# Patient Record
Sex: MALE | Race: WHITE | NOT HISPANIC OR LATINO | Employment: OTHER | URBAN - METROPOLITAN AREA
[De-identification: names, ages, dates, MRNs, and addresses within clinical notes are randomized per-mention and may not be internally consistent; named-entity substitution may affect disease eponyms.]

---

## 2017-07-16 ENCOUNTER — APPOINTMENT (OUTPATIENT)
Dept: GENERAL RADIOLOGY | Facility: HOSPITAL | Age: 82
End: 2017-07-16

## 2017-07-16 ENCOUNTER — APPOINTMENT (OUTPATIENT)
Dept: CT IMAGING | Facility: HOSPITAL | Age: 82
End: 2017-07-16

## 2017-07-16 ENCOUNTER — HOSPITAL ENCOUNTER (INPATIENT)
Facility: HOSPITAL | Age: 82
LOS: 1 days | Discharge: HOSPICE | End: 2017-07-17
Attending: EMERGENCY MEDICINE | Admitting: INTERNAL MEDICINE

## 2017-07-16 DIAGNOSIS — J18.9 PNEUMONIA OF RIGHT LOWER LOBE DUE TO INFECTIOUS ORGANISM: Primary | ICD-10-CM

## 2017-07-16 DIAGNOSIS — R13.10 DYSPHAGIA, UNSPECIFIED: ICD-10-CM

## 2017-07-16 DIAGNOSIS — R53.83 LETHARGY: ICD-10-CM

## 2017-07-16 DIAGNOSIS — R41.841 COGNITIVE COMMUNICATION DEFICIT: ICD-10-CM

## 2017-07-16 DIAGNOSIS — I63.9 CEREBROVASCULAR ACCIDENT (CVA), UNSPECIFIED MECHANISM (HCC): ICD-10-CM

## 2017-07-16 PROBLEM — J69.0 ASPIRATION PNEUMONIA (HCC): Status: ACTIVE | Noted: 2017-07-16

## 2017-07-16 PROBLEM — R33.9 URINARY RETENTION: Status: ACTIVE | Noted: 2017-07-16

## 2017-07-16 PROBLEM — I25.10 CAD (CORONARY ARTERY DISEASE): Status: ACTIVE | Noted: 2017-07-16

## 2017-07-16 PROBLEM — Z85.528 PERSONAL HISTORY OF RENAL CANCER: Status: ACTIVE | Noted: 2017-07-16

## 2017-07-16 PROBLEM — E78.5 HYPERLIPIDEMIA: Status: ACTIVE | Noted: 2017-07-16

## 2017-07-16 LAB
ALBUMIN SERPL-MCNC: 3.7 G/DL (ref 3.2–4.8)
ALBUMIN/GLOB SERPL: 1.2 G/DL (ref 1.5–2.5)
ALP SERPL-CCNC: 64 U/L (ref 25–100)
ALT SERPL W P-5'-P-CCNC: 8 U/L (ref 7–40)
AMORPH URATE CRY URNS QL MICRO: ABNORMAL /HPF
ANION GAP SERPL CALCULATED.3IONS-SCNC: 7 MMOL/L (ref 3–11)
AST SERPL-CCNC: 17 U/L (ref 0–33)
BACTERIA UR QL AUTO: ABNORMAL /HPF
BACTERIA UR QL AUTO: ABNORMAL /HPF
BASOPHILS # BLD AUTO: 0.02 10*3/MM3 (ref 0–0.2)
BASOPHILS NFR BLD AUTO: 0.3 % (ref 0–1)
BILIRUB SERPL-MCNC: 0.7 MG/DL (ref 0.3–1.2)
BILIRUB UR QL STRIP: NEGATIVE
BILIRUB UR QL STRIP: NEGATIVE
BUN BLD-MCNC: 19 MG/DL (ref 9–23)
BUN/CREAT SERPL: 19 (ref 7–25)
CALCIUM SPEC-SCNC: 9.7 MG/DL (ref 8.7–10.4)
CHLORIDE SERPL-SCNC: 102 MMOL/L (ref 99–109)
CLARITY UR: ABNORMAL
CLARITY UR: ABNORMAL
CO2 SERPL-SCNC: 26 MMOL/L (ref 20–31)
COLOR UR: ABNORMAL
COLOR UR: YELLOW
CREAT BLD-MCNC: 1 MG/DL (ref 0.6–1.3)
D-LACTATE SERPL-SCNC: 1 MMOL/L (ref 0.5–2)
DEPRECATED RDW RBC AUTO: 48.4 FL (ref 37–54)
EOSINOPHIL # BLD AUTO: 0.29 10*3/MM3 (ref 0–0.3)
EOSINOPHIL NFR BLD AUTO: 4.2 % (ref 0–3)
ERYTHROCYTE [DISTWIDTH] IN BLOOD BY AUTOMATED COUNT: 14.3 % (ref 11.3–14.5)
GFR SERPL CREATININE-BSD FRML MDRD: 70 ML/MIN/1.73
GLOBULIN UR ELPH-MCNC: 3.2 GM/DL
GLUCOSE BLD-MCNC: 121 MG/DL (ref 70–100)
GLUCOSE BLDC GLUCOMTR-MCNC: 113 MG/DL (ref 70–130)
GLUCOSE UR STRIP-MCNC: NEGATIVE MG/DL
GLUCOSE UR STRIP-MCNC: NEGATIVE MG/DL
HCT VFR BLD AUTO: 45 % (ref 38.9–50.9)
HGB BLD-MCNC: 14.8 G/DL (ref 13.1–17.5)
HGB UR QL STRIP.AUTO: ABNORMAL
HGB UR QL STRIP.AUTO: ABNORMAL
HYALINE CASTS UR QL AUTO: ABNORMAL /LPF
HYALINE CASTS UR QL AUTO: ABNORMAL /LPF
IMM GRANULOCYTES # BLD: 0.02 10*3/MM3 (ref 0–0.03)
IMM GRANULOCYTES NFR BLD: 0.3 % (ref 0–0.6)
KETONES UR QL STRIP: ABNORMAL
KETONES UR QL STRIP: NEGATIVE
LEUKOCYTE ESTERASE UR QL STRIP.AUTO: ABNORMAL
LEUKOCYTE ESTERASE UR QL STRIP.AUTO: ABNORMAL
LYMPHOCYTES # BLD AUTO: 1.17 10*3/MM3 (ref 0.6–4.8)
LYMPHOCYTES NFR BLD AUTO: 16.9 % (ref 24–44)
MCH RBC QN AUTO: 30.6 PG (ref 27–31)
MCHC RBC AUTO-ENTMCNC: 32.9 G/DL (ref 32–36)
MCV RBC AUTO: 93 FL (ref 80–99)
MONOCYTES # BLD AUTO: 0.52 10*3/MM3 (ref 0–1)
MONOCYTES NFR BLD AUTO: 7.5 % (ref 0–12)
NEUTROPHILS # BLD AUTO: 4.9 10*3/MM3 (ref 1.5–8.3)
NEUTROPHILS NFR BLD AUTO: 70.8 % (ref 41–71)
NITRITE UR QL STRIP: NEGATIVE
NITRITE UR QL STRIP: NEGATIVE
PH UR STRIP.AUTO: 5.5 [PH] (ref 5–8)
PH UR STRIP.AUTO: 5.5 [PH] (ref 5–8)
PLATELET # BLD AUTO: 144 10*3/MM3 (ref 150–450)
PMV BLD AUTO: 9.3 FL (ref 6–12)
POTASSIUM BLD-SCNC: 4.2 MMOL/L (ref 3.5–5.5)
PROCALCITONIN SERPL-MCNC: <0.05 NG/ML
PROT SERPL-MCNC: 6.9 G/DL (ref 5.7–8.2)
PROT UR QL STRIP: ABNORMAL
PROT UR QL STRIP: NEGATIVE
RBC # BLD AUTO: 4.84 10*6/MM3 (ref 4.2–5.76)
RBC # UR: ABNORMAL /HPF
RBC # UR: ABNORMAL /HPF
REF LAB TEST METHOD: ABNORMAL
REF LAB TEST METHOD: ABNORMAL
SODIUM BLD-SCNC: 135 MMOL/L (ref 132–146)
SP GR UR STRIP: 1.02 (ref 1–1.03)
SP GR UR STRIP: 1.03 (ref 1–1.03)
SQUAMOUS #/AREA URNS HPF: ABNORMAL /HPF
SQUAMOUS #/AREA URNS HPF: ABNORMAL /HPF
TROPONIN I SERPL-MCNC: 0.07 NG/ML (ref 0–0.07)
UROBILINOGEN UR QL STRIP: ABNORMAL
UROBILINOGEN UR QL STRIP: ABNORMAL
WBC NRBC COR # BLD: 6.92 10*3/MM3 (ref 3.5–10.8)
WBC UR QL AUTO: ABNORMAL /HPF
WBC UR QL AUTO: ABNORMAL /HPF

## 2017-07-16 PROCEDURE — 94799 UNLISTED PULMONARY SVC/PX: CPT

## 2017-07-16 PROCEDURE — 25010000002 VANCOMYCIN 10 G RECONSTITUTED SOLUTION: Performed by: EMERGENCY MEDICINE

## 2017-07-16 PROCEDURE — 84145 PROCALCITONIN (PCT): CPT | Performed by: HOSPITALIST

## 2017-07-16 PROCEDURE — 87086 URINE CULTURE/COLONY COUNT: CPT | Performed by: EMERGENCY MEDICINE

## 2017-07-16 PROCEDURE — 81001 URINALYSIS AUTO W/SCOPE: CPT | Performed by: HOSPITALIST

## 2017-07-16 PROCEDURE — 94640 AIRWAY INHALATION TREATMENT: CPT

## 2017-07-16 PROCEDURE — 70450 CT HEAD/BRAIN W/O DYE: CPT

## 2017-07-16 PROCEDURE — 94760 N-INVAS EAR/PLS OXIMETRY 1: CPT

## 2017-07-16 PROCEDURE — 82962 GLUCOSE BLOOD TEST: CPT

## 2017-07-16 PROCEDURE — 87186 SC STD MICRODIL/AGAR DIL: CPT | Performed by: EMERGENCY MEDICINE

## 2017-07-16 PROCEDURE — 99223 1ST HOSP IP/OBS HIGH 75: CPT | Performed by: HOSPITALIST

## 2017-07-16 PROCEDURE — 93005 ELECTROCARDIOGRAM TRACING: CPT | Performed by: EMERGENCY MEDICINE

## 2017-07-16 PROCEDURE — 83605 ASSAY OF LACTIC ACID: CPT | Performed by: EMERGENCY MEDICINE

## 2017-07-16 PROCEDURE — 84484 ASSAY OF TROPONIN QUANT: CPT

## 2017-07-16 PROCEDURE — 25010000002 PIPERACILLIN-TAZOBACTAM: Performed by: EMERGENCY MEDICINE

## 2017-07-16 PROCEDURE — 36415 COLL VENOUS BLD VENIPUNCTURE: CPT

## 2017-07-16 PROCEDURE — 85025 COMPLETE CBC W/AUTO DIFF WBC: CPT | Performed by: EMERGENCY MEDICINE

## 2017-07-16 PROCEDURE — 99285 EMERGENCY DEPT VISIT HI MDM: CPT

## 2017-07-16 PROCEDURE — 71010 HC CHEST PA OR AP: CPT

## 2017-07-16 PROCEDURE — 87077 CULTURE AEROBIC IDENTIFY: CPT | Performed by: EMERGENCY MEDICINE

## 2017-07-16 PROCEDURE — 87076 CULTURE ANAEROBE IDENT EACH: CPT | Performed by: EMERGENCY MEDICINE

## 2017-07-16 PROCEDURE — 87040 BLOOD CULTURE FOR BACTERIA: CPT | Performed by: EMERGENCY MEDICINE

## 2017-07-16 PROCEDURE — 80053 COMPREHEN METABOLIC PANEL: CPT | Performed by: EMERGENCY MEDICINE

## 2017-07-16 PROCEDURE — 81001 URINALYSIS AUTO W/SCOPE: CPT | Performed by: EMERGENCY MEDICINE

## 2017-07-16 PROCEDURE — P9612 CATHETERIZE FOR URINE SPEC: HCPCS

## 2017-07-16 RX ORDER — ALBUTEROL SULFATE 2.5 MG/3ML
2.5 SOLUTION RESPIRATORY (INHALATION) ONCE
Status: COMPLETED | OUTPATIENT
Start: 2017-07-16 | End: 2017-07-16

## 2017-07-16 RX ORDER — ASPIRIN 300 MG/1
300 SUPPOSITORY RECTAL DAILY
Status: DISCONTINUED | OUTPATIENT
Start: 2017-07-17 | End: 2017-07-17 | Stop reason: HOSPADM

## 2017-07-16 RX ORDER — SODIUM CHLORIDE 9 MG/ML
100 INJECTION, SOLUTION INTRAVENOUS CONTINUOUS
Status: DISCONTINUED | OUTPATIENT
Start: 2017-07-16 | End: 2017-07-17

## 2017-07-16 RX ORDER — HEPARIN SODIUM 5000 [USP'U]/ML
5000 INJECTION, SOLUTION INTRAVENOUS; SUBCUTANEOUS EVERY 8 HOURS SCHEDULED
Status: DISCONTINUED | OUTPATIENT
Start: 2017-07-17 | End: 2017-07-17 | Stop reason: HOSPADM

## 2017-07-16 RX ORDER — ASPIRIN 325 MG
325 TABLET ORAL DAILY
Status: DISCONTINUED | OUTPATIENT
Start: 2017-07-17 | End: 2017-07-17 | Stop reason: HOSPADM

## 2017-07-16 RX ORDER — CLOPIDOGREL BISULFATE 75 MG/1
75 TABLET ORAL DAILY
COMMUNITY

## 2017-07-16 RX ORDER — POTASSIUM CHLORIDE 750 MG/1
10 TABLET, FILM COATED, EXTENDED RELEASE ORAL 2 TIMES DAILY
COMMUNITY

## 2017-07-16 RX ORDER — SIMVASTATIN 20 MG
20 TABLET ORAL NIGHTLY
COMMUNITY

## 2017-07-16 RX ORDER — IPRATROPIUM BROMIDE AND ALBUTEROL SULFATE 2.5; .5 MG/3ML; MG/3ML
3 SOLUTION RESPIRATORY (INHALATION)
Status: DISCONTINUED | OUTPATIENT
Start: 2017-07-16 | End: 2017-07-17 | Stop reason: HOSPADM

## 2017-07-16 RX ORDER — CARVEDILOL 3.12 MG/1
3.12 TABLET ORAL 2 TIMES DAILY WITH MEALS
COMMUNITY

## 2017-07-16 RX ORDER — LEVOTHYROXINE SODIUM 0.05 MG/1
50 TABLET ORAL DAILY
COMMUNITY

## 2017-07-16 RX ORDER — ASPIRIN 300 MG/1
300 SUPPOSITORY RECTAL ONCE
Status: COMPLETED | OUTPATIENT
Start: 2017-07-16 | End: 2017-07-16

## 2017-07-16 RX ORDER — LORAZEPAM 2 MG/ML
0.5 INJECTION INTRAMUSCULAR EVERY 4 HOURS PRN
Status: DISCONTINUED | OUTPATIENT
Start: 2017-07-16 | End: 2017-07-17 | Stop reason: HOSPADM

## 2017-07-16 RX ORDER — ASPIRIN 81 MG/1
81 TABLET ORAL DAILY
COMMUNITY

## 2017-07-16 RX ORDER — SODIUM CHLORIDE 0.9 % (FLUSH) 0.9 %
1-10 SYRINGE (ML) INJECTION AS NEEDED
Status: DISCONTINUED | OUTPATIENT
Start: 2017-07-16 | End: 2017-07-17 | Stop reason: HOSPADM

## 2017-07-16 RX ORDER — ATORVASTATIN CALCIUM 40 MG/1
80 TABLET, FILM COATED ORAL NIGHTLY
Status: DISCONTINUED | OUTPATIENT
Start: 2017-07-16 | End: 2017-07-17 | Stop reason: HOSPADM

## 2017-07-16 RX ORDER — TAMSULOSIN HYDROCHLORIDE 0.4 MG/1
1 CAPSULE ORAL NIGHTLY
COMMUNITY

## 2017-07-16 RX ADMIN — SODIUM CHLORIDE 100 ML/HR: 9 INJECTION, SOLUTION INTRAVENOUS at 22:47

## 2017-07-16 RX ADMIN — TAZOBACTAM SODIUM AND PIPERACILLIN SODIUM 4.5 G: .5; 4 INJECTION, POWDER, LYOPHILIZED, FOR SOLUTION INTRAVENOUS at 20:41

## 2017-07-16 RX ADMIN — ALBUTEROL SULFATE 2.5 MG: 2.5 SOLUTION RESPIRATORY (INHALATION) at 19:54

## 2017-07-16 RX ADMIN — VANCOMYCIN HYDROCHLORIDE 1250 MG: 10 INJECTION, POWDER, LYOPHILIZED, FOR SOLUTION INTRAVENOUS at 20:40

## 2017-07-16 RX ADMIN — SODIUM CHLORIDE 500 ML: 9 INJECTION, SOLUTION INTRAVENOUS at 22:48

## 2017-07-16 RX ADMIN — SODIUM CHLORIDE 500 ML: 9 INJECTION, SOLUTION INTRAVENOUS at 19:31

## 2017-07-16 RX ADMIN — ASPIRIN 300 MG: 300 SUPPOSITORY RECTAL at 20:41

## 2017-07-16 NOTE — ED PROVIDER NOTES
Subjective   HPI Comments: Kev Bean is a 89 y.o.male who presents to the ED with neurological complaints. Per the patients son, the patient laid down to take a nap a 1300 this afternoon and when he tried waking the patient up at 1700, he was experiencing left facial droop and decline in mental status. Mr. Angulo has hx of renal cancer and is currently in hospice care, however hospice was not at his home at the time of onset, per the flight crew. They also report the patient has a DNR order. No other acute complaints at this time.     Patient is a 89 y.o. male presenting with neurologic complaint.   History provided by:  EMS personnel and relative  History limited by:  Mental status change  Neurologic Problem   The patient's primary symptoms include an altered mental status, focal weakness and weakness (left sided weakness). This is a new problem. The current episode started today. The neurological problem developed suddenly. The last time the patient was known to be well was 7/16/2017 5:00 PM.  The problem is unchanged. There was left-sided and facial focality noted. Past treatments include nothing.       Review of Systems   Unable to perform ROS: Mental status change   Neurological: Positive for focal weakness, facial asymmetry (left facial droop) and weakness (left sided weakness).       Past Medical History:   Diagnosis Date   • Cholelithiases    • Disease of thyroid gland    • Elevated troponin    • Hyperlipidemia    • Ileus    • LBBB (left bundle branch block)    • Renal insufficiency        No Known Allergies    History reviewed. No pertinent surgical history.    Family History   Problem Relation Age of Onset   • Heart disease Mother    • No Known Problems Father    • Hypertension Sister    • Hypertension Brother    • Transient ischemic attack Son    • Coronary artery disease Son    • Asthma Son    • Diabetes Son    • No Known Problems Son        Social History     Social History   • Marital status:       Spouse name: N/A   • Number of children: N/A   • Years of education: N/A     Social History Main Topics   • Smoking status: Unknown If Ever Smoked   • Smokeless tobacco: Current User     Types: Chew   • Alcohol use No   • Drug use: No   • Sexual activity: Defer     Other Topics Concern   • None     Social History Narrative    Patient is only living sibling of 8.          Objective   Physical Exam   Constitutional: No distress.   HENT:   Head: Normocephalic and atraumatic.   Eyes: Conjunctivae are normal. No scleral icterus.   Neck: Normal range of motion. Neck supple.   Cardiovascular: Normal rate, regular rhythm and normal heart sounds.    Pulmonary/Chest: Effort normal. No respiratory distress. He has wheezes. He has no rales.   Abdominal: Soft. Bowel sounds are normal. There is no tenderness.   Musculoskeletal: Normal range of motion. He exhibits no edema.   Neurological: He is alert.   The patient makes eye contact, but does not answer questions. He is diffusely weak and will not follow commands.   Skin: Skin is warm and dry. He is not diaphoretic. No erythema.   Nursing note and vitals reviewed.      Procedures         ED Course  ED Course   Comment By Time   I spoke with his family and they weren't sure why he was sent here.  They advise that there are some other family members on the way who will possibly be able to provide more history.  They do report that he was discharged from the hospital 2 days ago after being admitted for what sounds like a bowel obstruction.  They relay that they were told he was not healthy enough to undergo surgery.  They confirm that he is enrolled in hospice although tell me that the kidney cancer history is remote and not believed to be active.  Family did confirm that he wishes to be considered DNR. Navneet Dykes MD 07/16 2993   I spoke with his son on the phone.  His son advises that he has medical power of .  He advises me that his father laid down to  take a nap at 1:00 this afternoon.  Around 5:00 they had trouble waking him up and he didn't seem right.  His son noted that eventually he started waking up and at that point they noted a left facial droop. Navneet Dykes MD 07/16 1844   I spoke with Mr. Bean's family about findings.  He is more awake and alert now.  He does seem weak with the left upper extremity.  I have advised them of the diagnosis of pneumonia and possibly a stroke as well.  They advise that he was recently taken off his Plavix but does take aspirin daily I will order rectal aspirin.  His daughter tells me that he has had a cough and he has been having a lot of trouble coughing anything up and they advise that he is just not capable of producing a deep cough.  I have paged the hospitalist on-call and will seek admission.  I have explained to them why he is not a candidate for TPA.  I have ordered rectal aspirin Navneet Dykes MD 07/16 1952                     MDM  Number of Diagnoses or Management Options  Cerebrovascular accident (CVA), unspecified mechanism: new and requires workup  Lethargy: new and requires workup  Pneumonia of right lower lobe due to infectious organism: new and requires workup     Amount and/or Complexity of Data Reviewed  Clinical lab tests: ordered and reviewed  Tests in the radiology section of CPT®: ordered and reviewed  Obtain history from someone other than the patient: yes  Discuss the patient with other providers: yes  Independent visualization of images, tracings, or specimens: yes    Patient Progress  Patient progress: improved      Final diagnoses:   Pneumonia of right lower lobe due to infectious organism   Lethargy   Cerebrovascular accident (CVA), unspecified mechanism       Documentation assistance provided by frederic Greer.  Information recorded by the frederic was done at my direction and has been verified and validated by me.     Tona Greer  07/16/17 4317     Tona Brown  Percy  07/16/17 1946       Navneet Dykes MD  07/16/17 8397

## 2017-07-17 ENCOUNTER — APPOINTMENT (OUTPATIENT)
Dept: MRI IMAGING | Facility: HOSPITAL | Age: 82
End: 2017-07-17

## 2017-07-17 VITALS
RESPIRATION RATE: 16 BRPM | TEMPERATURE: 98 F | DIASTOLIC BLOOD PRESSURE: 65 MMHG | HEART RATE: 59 BPM | BODY MASS INDEX: 21.69 KG/M2 | SYSTOLIC BLOOD PRESSURE: 104 MMHG | WEIGHT: 135 LBS | OXYGEN SATURATION: 97 % | HEIGHT: 66 IN

## 2017-07-17 LAB
ARTICHOKE IGE QN: 70 MG/DL (ref 0–130)
CHOLEST SERPL-MCNC: 105 MG/DL (ref 0–200)
HBA1C MFR BLD: 6.2 % (ref 4.8–5.6)
HDLC SERPL-MCNC: 22 MG/DL (ref 40–60)
TRIGL SERPL-MCNC: 79 MG/DL (ref 0–150)

## 2017-07-17 PROCEDURE — 92610 EVALUATE SWALLOWING FUNCTION: CPT

## 2017-07-17 PROCEDURE — 99239 HOSP IP/OBS DSCHRG MGMT >30: CPT | Performed by: INTERNAL MEDICINE

## 2017-07-17 PROCEDURE — 92523 SPEECH SOUND LANG COMPREHEN: CPT

## 2017-07-17 PROCEDURE — 70551 MRI BRAIN STEM W/O DYE: CPT

## 2017-07-17 PROCEDURE — 83036 HEMOGLOBIN GLYCOSYLATED A1C: CPT | Performed by: HOSPITALIST

## 2017-07-17 PROCEDURE — 99223 1ST HOSP IP/OBS HIGH 75: CPT | Performed by: PSYCHIATRY & NEUROLOGY

## 2017-07-17 PROCEDURE — 25010000002 PIPERACILLIN-TAZOBACTAM: Performed by: HOSPITALIST

## 2017-07-17 PROCEDURE — 94799 UNLISTED PULMONARY SVC/PX: CPT

## 2017-07-17 PROCEDURE — 25010000002 HEPARIN (PORCINE) PER 1000 UNITS: Performed by: HOSPITALIST

## 2017-07-17 PROCEDURE — 94640 AIRWAY INHALATION TREATMENT: CPT

## 2017-07-17 PROCEDURE — 80061 LIPID PANEL: CPT | Performed by: HOSPITALIST

## 2017-07-17 RX ORDER — SODIUM CHLORIDE 9 MG/ML
75 INJECTION, SOLUTION INTRAVENOUS CONTINUOUS
Status: DISCONTINUED | OUTPATIENT
Start: 2017-07-17 | End: 2017-07-17 | Stop reason: HOSPADM

## 2017-07-17 RX ADMIN — ASPIRIN 300 MG: 300 SUPPOSITORY RECTAL at 11:43

## 2017-07-17 RX ADMIN — TAZOBACTAM SODIUM AND PIPERACILLIN SODIUM 4.5 G: .5; 4 INJECTION, POWDER, LYOPHILIZED, FOR SOLUTION INTRAVENOUS at 11:42

## 2017-07-17 RX ADMIN — HEPARIN SODIUM 5000 UNITS: 5000 INJECTION, SOLUTION INTRAVENOUS; SUBCUTANEOUS at 14:26

## 2017-07-17 RX ADMIN — IPRATROPIUM BROMIDE AND ALBUTEROL SULFATE 3 ML: .5; 3 SOLUTION RESPIRATORY (INHALATION) at 12:26

## 2017-07-17 RX ADMIN — IPRATROPIUM BROMIDE AND ALBUTEROL SULFATE 3 ML: .5; 3 SOLUTION RESPIRATORY (INHALATION) at 15:22

## 2017-07-17 RX ADMIN — IPRATROPIUM BROMIDE AND ALBUTEROL SULFATE 3 ML: .5; 3 SOLUTION RESPIRATORY (INHALATION) at 07:54

## 2017-07-17 RX ADMIN — TAZOBACTAM SODIUM AND PIPERACILLIN SODIUM 4.5 G: .5; 4 INJECTION, POWDER, LYOPHILIZED, FOR SOLUTION INTRAVENOUS at 04:50

## 2017-07-17 RX ADMIN — TAZOBACTAM SODIUM AND PIPERACILLIN SODIUM 4.5 G: .5; 4 INJECTION, POWDER, LYOPHILIZED, FOR SOLUTION INTRAVENOUS at 17:08

## 2017-07-17 NOTE — H&P
"    [facility] Medicine Services  HISTORY AND PHYSICAL    Primary Care Physician: Timothy Cortez MD    Subjective     Chief Complaint: Left-sided weakness      History of Present Illness:   Patient is a 89-year-old male presented to [facility] emergency department with complaints of left-sided weakness and facial droop.  According to family patient was eating lunch with some relatives, and took a nap at 2 PM.  Son states that when he woke the patient up at 4:30pm he had left-sided weakness, left-sided facial droop, left-sided neglect, and unable to speak.  Son states that he called EMS.  Patient was treated last week at [facility] for an ileus.  Son states the patient had a Daniel placed at this time because he stopped urinating on his own.  Patient has a pertinent past medical history for remote renal cancer status post nephrectomy, coronary artery disease (s/p CABG at [facility]), and hyperlipidemia.  Patient does chew tobacco on a daily basis.  While in the emergency department patient was treated with albuterol neb, aspirin suppository, Zosyn, and vancomycin.  Chest x-ray reveals \"asymmetric right lung airspace opacities, consider pneumonia.\" CT w/o contrast had age appropriate changes. Patient will be admitted to the hospital medicine service for further evaluation and treatment.     Found lethargic with L sided weakness at home. Lethargic. Appears short of breath and anxious.  Family at bedside, states he was at outside hospital for ileus this past week.    ROS completed with son's help  Review of Systems   Unable to perform ROS: Patient nonverbal   Neurological: Positive for facial asymmetry, speech difficulty and weakness.   All other systems reviewed and are negative.     Otherwise complete ROS performed and negative except as mentioned in the HPI.    Past Medical History:   Diagnosis Date   • Cholelithiases    • Disease of thyroid gland    • " "Elevated troponin    • Hyperlipidemia    • Ileus    • LBBB (left bundle branch block)    • Renal insufficiency        History reviewed. No pertinent surgical history.    Family History   Problem Relation Age of Onset   • Heart disease Mother    • No Known Problems Father    • Hypertension Sister    • Hypertension Brother    • Transient ischemic attack Son    • Coronary artery disease Son    • Asthma Son    • Diabetes Son    • No Known Problems Son        Social History     Social History   • Marital status:      Spouse name: N/A   • Number of children: N/A   • Years of education: N/A     Occupational History   • Not on file.     Social History Main Topics   • Smoking status: Unknown If Ever Smoked   • Smokeless tobacco: Current User     Types: Chew   • Alcohol use No   • Drug use: No   • Sexual activity: Defer     Other Topics Concern   • Not on file     Social History Narrative    Patient is only living sibling of 8.        Medications:  Reviewed on Admission    Allergies:  No Known Allergies      Objective     Physical Exam:  Vital Signs: /82  Pulse 93  Temp 97.8 °F (36.6 °C) (Axillary)   Resp 26  Ht 66\" (167.6 cm)  Wt 135 lb (61.2 kg)  SpO2 96%  BMI 21.79 kg/m2  Physical Exam   Constitutional: He appears well-developed. He appears cachectic. He has a sickly appearance.   HENT:   Head: Normocephalic and atraumatic.   Eyes: EOM are normal. Pupils are equal, round, and reactive to light. No scleral icterus.   Neck: Normal range of motion. Neck supple. No JVD present.   Cardiovascular: Normal rate, regular rhythm, normal heart sounds and intact distal pulses.  Exam reveals no gallop and no friction rub.    No murmur heard.  Pulmonary/Chest: Accessory muscle usage present. He has decreased breath sounds in the right upper field, the right middle field and the right lower field. He has wheezes in the left upper field and the left middle field.   Abdominal: Soft. Bowel sounds are normal. He exhibits " no distension. There is no tenderness. There is no rebound and no guarding.   Musculoskeletal: He exhibits no edema, tenderness or deformity.   Neurological: He is alert. He exhibits abnormal muscle tone.   Left-sided weakness   Skin: Skin is warm and dry. No rash noted. He is not diaphoretic. No erythema. No pallor.   Psychiatric: Cognition and memory are impaired. He is noncommunicative.   Nursing note and vitals reviewed.    Ill appearing, mild respiratory distress  Op dry, neck supple  Cachexia  RRR  Accessory muscle use, decrease breath sounds on R  +BS, ND, NT  L weakness  No rashes  Unable to answer questions    Results Reviewed:    Results from last 7 days  Lab Units 07/16/17  1851   WBC 10*3/mm3 6.92   HEMOGLOBIN g/dL 14.8   PLATELETS 10*3/mm3 144*       Results from last 7 days  Lab Units 07/16/17  1851   SODIUM mmol/L 135   POTASSIUM mmol/L 4.2   CO2 mmol/L 26.0   CREATININE mg/dL 1.00   GLUCOSE mg/dL 121*   CALCIUM mg/dL 9.7     Imaging Results (last 24 hours)     Procedure Component Value Units Date/Time    CT Head Without Contrast [[id number]]  (Abnormal) Collected:  07/16/17 1817     Updated:  07/16/17 1836    Narrative:       EXAM:    CT Head Without Intravenous Contrast    CLINICAL HISTORY:    89 years old, male; Signs and symptoms; Weakness, extremity; Left; Additional   info: Code 19    TECHNIQUE:    Axial computed tomography images of the head/brain without intravenous   contrast.  This CT exam was performed using one or more of the following dose   reduction techniques:  automated exposure control, adjustment of the mA and/or   kV according to patient size, and/or use of iterative reconstruction technique.    COMPARISON:    No relevant prior studies available.    FINDINGS:    Brain:  Generalized cerebral involutional change, age-appropriate.    Subcortical and periventricular white matter hypodensities.  No hemorrhage.    Ventricles:  Ventricular system demonstrates moderate diffuse compensatory    enlargement.    Bones/joints:  Unremarkable.  No acute fracture.    Soft tissues:  Unremarkable.    Sinuses:  Trace air-fluid level in the sphenoid sinus, consider mild acute   sphenoid sinusitis otherwise no acute findings.  Mild mucous membrane   thickening in the maxillary sinuses and ethmoid air cells.    Mastoid air cells:  Unremarkable as visualized.  No mastoid effusion.      Impression:       1.  Trace air-fluid level in the sphenoid sinus, consider mild acute sphenoid   sinusitis otherwise no acute findings.    2.  Generalized cerebral involutional change, age-appropriate.    3.  Chronic microvascular ischemic disease.    THIS DOCUMENT HAS BEEN ELECTRONICALLY SIGNED BY CHRISTI CARTER MD    XR Chest 1 View [[id number]]  (Abnormal) Collected:  07/16/17 1834     Updated:  07/16/17 1941    Narrative:       EXAM:    XR Chest, 1 View    CLINICAL HISTORY:    89 years old, male; Signs and symptoms; Shortness of breath; Additional info:   Weakness    TECHNIQUE:    Frontal view of the chest.    COMPARISON:    No relevant prior studies available.    FINDINGS:    Lungs:  Asymmetric right lung airspace opacities, consider pneumonia.    Pleural space:  Blunting of bilateral costophrenic angles, worse on the   right.  No pneumothorax.    Heart:  Moderate cardiomediastinal enlargement, correlate for cardiomegaly   versus pericardial effusion.    Mediastinum:  Mediastinal surgical clips.    Bones/joints: Sternal sutures intact.      Impression:       1.  Asymmetric right lung airspace opacities, consider pneumonia.    2.  Small left and moderate right pleural effusions.  3.  Moderate cardiomediastinal enlargement, correlate for cardiomegaly versus   pericardial effusion.      THIS DOCUMENT HAS BEEN ELECTRONICALLY SIGNED BY CHRISTI CARTER MD          I have personally reviewed and interpreted available lab data, radiology studies and ECG obtained at time of admission.     Assessment / Plan     Problem List:   Hospital Problem  List     * (Principal)CVA (cerebral vascular accident)    CAD (coronary artery disease)    Personal history of renal cancer    Hyperlipidemia    Urinary retention    Aspiration pneumonia          Assessment:  - CVA  - Pneumonia   - Consider Aspiration    Plan:  - Admit to telemetry  - VS q4h  - Neuro Checks q4h  - NIHSS qshift  - Plan MRI tonight  - NPO    - Will need SLP eval  - PT/OT Eval  - Consult to neurology  - Vanc & Zosyn in ED   - Will continue on admission   - Renal dose  - Replace rooney catheter if not done in ED  - ECHO tomorrow  - Carotids tomorrow  - Case Management   - Discharge planning  - Consult to Palliative Care   - Patient is an AND, however goals of care need to be established for this patient   - Family is unsure of escalation of care    Suspected CVA  - ischemic CVA protocol, rectal aspirin, STATIN when appropriate  Probable R aspiration pneumonia/gram negative pneumonia  - zosyn/vanc empirically  - nebs  Recent urinary retention  - Rooney cathter, replace rooney  DNR, consult palliative care, I do not feel that he will improve    DVT prophylaxis: TEDs/SCDs  Code Status: AND  Admission Status: Patient will be admitted to (LEXOBS or LEXINPT)     Brandy Richards, APRN 07/16/17 8:38 PM

## 2017-07-17 NOTE — NURSING NOTE
Palliative consult received, chart reviewed. Visit to pt with several family members present. Discharge order and summary already complete, to discharge home with hospice. Pt was home hospice pt prior to admission, but apparently benefit was revoked upon admission to hospital yesterday. Libia Lubin, Hospice SW notified of plan, and is contacting [facility] to determine what arrangements are needed to continue Hospice services at home upon discharge. Pt has meds ordered for symptom management, appears comfortable at this time, and family agrees. Discussed with Palliative HERMAN Rachel; appears no role for inpatient palliative at this time, as pt is going home today and resuming hospice services. Discussed with family, all verbalize agreement with this assessment and plan.

## 2017-07-17 NOTE — PLAN OF CARE
Problem: Patient Care Overview (Adult)  Goal: Plan of Care Review  Outcome: Ongoing (interventions implemented as appropriate)    07/17/17 1207   Coping/Psychosocial Response Interventions   Plan Of Care Reviewed With patient;family;son  (son (POA))   Patient Care Overview   Progress (initial eval)   Outcome Evaluation   Outcome Summary/Follow up Plan BS & S/L eval per stroke protocol. Severe communication deficit currently impacting all domains of language. Pt was very lethargic and intermittently alert during eval. Currently not verbalizing in response to questions or on command, however one spontaneous verbalization noted that was unintelligible. Pt noted to spontanously point for paper towel and able to use object to wipe mouth. Not following simple commands or responding to simple Y/N questions. Clinical dysphagia eval complete. Failed RN dysphagia screen. L facial droop, decr'd strength and ROM bilaterally. Trials of thins via ice and tsp and pureed consistencies given. Overt s/s of aspiration c/b throat clearing and wet vocal quality w/ thins and inconsistent swallow initiation w/ all. Pt did not attempt to swallow puree, and residue was removed from oral cavity via swab. Pt's son (POA) and other family present. Pt has planned d/c to return home on hospice. SLP provided extensive education regarding s/s and risk of aspiration and safest recs vs. comfort diet recs. Pt's family agreeable to comfort diet despite risks. Also educated regarding importance of oral care, general aspiration and reflux precautions, small bites/ sips, comfort diet recs, and only providing PO when pt alert, family verbalized understanding and agreed w/ recommendations and precautions. SLP spoke to Dr. Rico who provided verbal order for comfort diet for pt. RECS: Safest NPO, comfort diet of thins and puree, meds crushed in puree (if alert), only feed when alert, general aspiration and reflux precautions, small bites/sips (via tsp or  cup), No further ST needs at this time.         Problem: Stroke (Ischemic) (Adult)  Goal: Signs and Symptoms of Listed Potential Problems Will be Absent or Manageable (Stroke)  Outcome: Ongoing (interventions implemented as appropriate)    07/17/17 1207   Stroke (Ischemic)   Problems Assessed (Stroke (Ischemic)/TIA) cognitive impairment;communication impairment;eating/swallowing impairment   Problems Present (Stroke (Ischemic)/TIA) cognitive impairment;communication impairment;eating/swallowing impairment

## 2017-07-17 NOTE — DISCHARGE SUMMARY
[facility] Medicine Services  DISCHARGE SUMMARY       Date of Admission: 7/16/2017  Date of Discharge:  7/17/2017  Primary Care Physician: Timothy Cortez MD  Consulting Physician(s)     Provider Relationship    Dimas Hernandez MD Consulting Physician    Rj Lawton DO Consulting Physician          Discharge Diagnoses:  Active Hospital Problems (** Indicates Principal Problem)    Diagnosis Date Noted   • **CVA (cerebral vascular accident) [I63.9] 07/16/2017     Priority: High   • Aspiration pneumonia [J69.0] 07/16/2017     Priority: Medium   • CAD (coronary artery disease) [I25.10] 07/16/2017   • Personal history of renal cancer [Z85.528] 07/16/2017   • Hyperlipidemia [E78.5] 07/16/2017   • Urinary retention [R33.9] 07/16/2017   • Pneumonia of right lower lobe due to infectious organism [J18.9] 07/16/2017      Resolved Hospital Problems    Diagnosis Date Noted Date Resolved   No resolved problems to display.       Presenting Problem/History of Present Illness  Pneumonia of right lower lobe due to infectious organism [J18.9]      Chief Complaint: left sided weakness  Day of Discharge: Mr. Bean's family has decided that they would like to go home with hospice today and do not wish to further escalate care here.     Hospital Course  Patient is a 89 y.o. male presented with lethargy and left sided weakness at home.  Pt also had possible aspiration PNA on CXR on admission.  He was initiated on Vanc and Zosyn (recent hospitalization at [facility] for ileus).  Pt was found to have a right centrum semiovale CVA on MRI brain.  Patient's family decided that they wanted to go home with hospice and did not wish to further escalate care.  We are awaiting Hospice evaluation here then will plan to discharge him home on his previous meds.  Given the overall GOC and lack of other evidence of infection, will stop ABX at this time.     Procedures Performed  CXR-  1. Asymmetric right lung  "airspace opacities, consider pneumonia.   2. Small left and moderate right pleural effusions.  3. Moderate cardiomediastinal enlargement, correlate for cardiomegaly versus   pericardial effusion.        CT Head-   1. Trace air-fluid level in the sphenoid sinus, consider mild acute sphenoid   sinusitis otherwise no acute findings.   2. Generalized cerebral involutional change, age-appropriate.   3. Chronic microvascular ischemic disease.    MRI Brain-  ACUTE/SUBACUTE ISCHEMIC INFARCT in the RIGHT centrum semiovale without   evidence of obvious intracranial hemorrhage or hemorrhagic transformation.          Consults:   Consults     Date and Time Order Name Status Description    7/16/2017 2209 Inpatient Consult to Neurology      7/16/2017 2057 Inpatient Consult to Palliative Care MD            Pertinent Test Results:    Results from last 7 days  Lab Units 07/16/17  1851   WBC 10*3/mm3 6.92   HEMOGLOBIN g/dL 14.8   HEMATOCRIT % 45.0   PLATELETS 10*3/mm3 144*       Results from last 7 days  Lab Units 07/16/17  1851   SODIUM mmol/L 135   POTASSIUM mmol/L 4.2   CHLORIDE mmol/L 102   CO2 mmol/L 26.0   BUN mg/dL 19   CREATININE mg/dL 1.00   GLUCOSE mg/dL 121*   CALCIUM mg/dL 9.7       Condition on Discharge:  Declining/home with hospice    Physical Exam on Discharge:/63 (BP Location: Right arm, Patient Position: Lying)  Pulse 73  Temp 96.7 °F (35.9 °C) (Temporal Artery )   Resp 16 Comment: 23 seconds of apnea. cheyne mckenzie pattern  Ht 66\" (167.6 cm)  Wt 135 lb (61.2 kg)  SpO2 98%  BMI 21.79 kg/m2  Physical Exam  General-unresponsive, groans occasionally, cachetic, elderly WM  HEENT- PERRLA  CVS- RRR, no M/R/G  Pulm- labored breathing, poor air movement bilaterally  Abd- soft, NT, ND, (+) BS  Extr- no c/c/e  Neuro-unable to assess       Discharge Disposition  Home or Self Care    Discharge Medications   Kev Bean   Home Medication Instructions ELIUD:[id number]    Printed on:07/17/17 1024   Medication " Information                      aspirin 81 MG EC tablet  Take 81 mg by mouth Daily.             carvedilol (COREG) 3.125 MG tablet  Take 3.125 mg by mouth 2 (Two) Times a Day With Meals.             clopidogrel (PLAVIX) 75 MG tablet  Take 75 mg by mouth Daily.             levothyroxine (SYNTHROID, LEVOTHROID) 50 MCG tablet  Take 50 mcg by mouth Daily.             potassium chloride (K-DUR) 10 MEQ CR tablet  Take 10 mEq by mouth 2 (Two) Times a Day.             simvastatin (ZOCOR) 20 MG tablet  Take 20 mg by mouth Every Night.             tamsulosin (FLOMAX) 0.4 MG capsule 24 hr capsule  Take 1 capsule by mouth Every Night.                 Discharge Diet:     Discharge Care Plan / Instructions:    Activity at Discharge:     Follow-up Appointments  No future appointments.      Test Results Pending at Discharge   Order Current Status    Blood Culture Preliminary result    Blood Culture Preliminary result    Urine Culture Preliminary result           Sue Rico MD 07/17/17 10:24 AM    Time: 35 minutes spent in discharge coordination today

## 2017-07-17 NOTE — CONSULTS
Palliative medicine consult not completed.   Plan already in place for return to home with hospice care, with discharge for today.   Hospice referral made.   No symptom management issues per nurse.

## 2017-07-17 NOTE — PROGRESS NOTES
Continued Stay Note   [facility]     Patient Name: Kev Bean  MRN: [id number]  Today's Date: 7/17/2017    Admit Date: 7/16/2017          Discharge Plan       07/17/17 1805    Case Management/Social Work Plan    Plan [facility] home services    Patient/Family In Agreement With Plan yes    Additional Comments Revoked hospice benefit for active treatment of new CVA with lt hemiparesis/aphasia.  Hospice reconsulted.  Son/family desire d/c home to resume hospice home care.  Referral to [facility] - update to primary RN.  Ambulance transport home per [facility] at 1800.  EMS DNR elected per son.  Advised to contact hospice on pt's arrival home.  Son verbalized understanding.              Discharge Codes     None        Expected Discharge Date and Time     Expected Discharge Date Expected Discharge Time    Jul 17, 2017             Perri Miranda RN

## 2017-07-17 NOTE — THERAPY EVALUATION
Acute Care - Speech Language Pathology Initial Evaluation  [facility]   Cognitive-Communication Evaluation     Patient Name: Kev Bean  : 1927  MRN: [id number]  Today's Date: 2017               Admit Date: 2017     Visit Dx:    ICD-10-CM ICD-9-CM   1. Pneumonia of right lower lobe due to infectious organism J18.9 483.8   2. Lethargy R53.83 780.79   3. Cerebrovascular accident (CVA), unspecified mechanism I63.9 434.91   4. Dysphagia, unspecified R13.10 787.20   5. Cognitive communication deficit R41.841 799.52     Patient Active Problem List   Diagnosis   • CAD (coronary artery disease)   • Personal history of renal cancer   • Hyperlipidemia   • Urinary retention   • CVA (cerebral vascular accident)   • Aspiration pneumonia   • Pneumonia of right lower lobe due to infectious organism     Past Medical History:   Diagnosis Date   • Cholelithiases    • Disease of thyroid gland    • Elevated troponin    • Hyperlipidemia    • Ileus    • LBBB (left bundle branch block)    • Renal insufficiency      History reviewed. No pertinent surgical history.       SLP EVALUATION (last 72 hours)      SLP Evaluation       17 1045                Rehab Evaluation    Document Type evaluation  -RD        Subjective Information unable to respond;agree to therapy   nonverbal; family agrees to evals  -RD        Patient Effort, Rehab Treatment poor  -RD        General Information    Patient Profile Review yes  -RD        Onset of Illness/Injury 17  -RD        Subjective Patient Observations lethargic, intermittently alert  -RD        Pertinent History Of Current Problem Adm w/ CVA, PNA of RLL, aspiration PNA. PMH: renal cancer, previously on hospice, CAD. Failed RN dysphagia screen. CXR: asymmetric RL airspace opacities, consistent w/ PNA, Small L & mod R pleural effusions. MRI: acute/subacute ischemic infarct in R centrum semiovale w/o evidence of hem.   -RD        Current Diet Limitations NPO  -RD         Precautions/Limitations, Vision other (see comments)   Difficult to assess  -RD        Precautions/Limitations, Hearing other (see comments)   difficult to assess  -RD        Prior Level of Function- Communication other (comment)   functional per family, on hospice at home  -RD        Prior Level of Function- Swallowing diet modifications- foods;other (comment)   softer foods per pt's family, but otherwise functional  -RD        Plans/Goals Discussed With patient and family;agreed upon   Pt's son (POA) present  -RD        Barriers to Rehab cognitive status;medically complex  -RD        Living Environment    Lives With child(nirmal), adult  -RD        Living Arrangements house;other (see comments)   previously home on hospice  -RD        Clinical Impression    SLP Diagnosis Severe communication deficit currently impacting all domains of language. Pt was very lethargic and intermittently alert during eval. Currently not verbalizing in response to questions or on command, however one spontaneous verbalization noted that was unintelligible. Pt noted to spontanously point for paper towel and able to use object to wipe mouth. Not following simple commands or responding to simple Y/N questions.   -RD        Functional Level At Time Of Evaluation impaired  -RD        Patient's Goals For Discharge patient could not state  -RD        Family Goals For Discharge other (see comments)   return home w/ hospice  -RD        Criteria for Skilled Therapeutic Interventions Met patient/family decline skilled intervention at this time  -RD        Therapy Frequency evaluation only  -RD        Anticipated Discharge Disposition other (see comments)   home w/ hospice  -RD        Pain Assessment    Pain Assessment Anne-Roper FACES  -RD        Anne-Baker FACES Pain Rating 0  -RD        Cognitive Assessment/Intervention    Current Cognitive/Communication Assessment impaired  -RD        Orientation Status unable/difficult to assess;other (see  comments)   currently nonverbal  -RD        Follows Commands/Answers Questions unable to answer questions  -RD        Short/Long Term Memory unable/difficult to assess;other (see comments)   2' nonverbal  -RD        Additional Documentation Cognitive Assessment Intervention (Group)  -RD        Cognitive Assessment Intervention    Behavior/Mood Observations lethargic  -RD        Attention difficulty attending to task/directions  -RD        Diffuse Language Characteristics diffuse language characteristics present;other (see comments)   not verbalizing  -RD        Communication Assessment/Intervention    Additional Documentation Auditory Comprehen Assess/Intervention (Group);Verbal Expression Assess/Intervention (Group);Motor Speech Assessment/Intervention (Group);Writing Assessment/Intervention (Group);Reading Assessment/Intervention (Group)  -RD        Auditory Comprehen Assess/Intervention    Auditory Comprehension severe impairment  -RD        Auditory Comprehen Assess/Intervention    Able to Identify Objects severe impairment  -RD        Able to Identify Pictures severe impairment  -RD        Answers Yes/No Questions severe impairment  -RD        Able to Follow Commands severe impairment  -RD        Verbal Expression Assess/Intervention    Automatic Speech severe impairment  -RD        Speech Repetition severe impairment  -RD        Conversational Speech severe impairment  -RD        Reading Assessment/Intervention    Reading Skills unable/difficult to assess  -RD        Writing Assessment/Intervention    Writing Skills unable/difficult to assess  -RD        Motor Speech Assess/Intervention    Motor Speech-Apraxia Observations unable/difficult to assess  -RD        Motor Speech- Dysarthria unable/difficult to assess  -RD          User Key  (r) = Recorded By, (t) = Taken By, (c) = Cosigned By    Initials Name Effective Dates    TRISH Iyer, MS CF-SLP 09/18/16 -            EDUCATION  The patient has been  educated in the following areas:   Cognitive Impairment Communication Impairment.    SLP Recommendation and Plan  SLP Diagnosis: Severe communication deficit currently impacting all domains of language. Pt was very lethargic and intermittently alert during eval. Currently not verbalizing in response to questions or on command, however one spontaneous verbalization noted that was unintelligible. Pt noted to spontanously point for paper towel and able to use object to wipe mouth. Not following simple commands or responding to simple Y/N questions.         Criteria for Skilled Therapeutic Interventions Met: patient/family decline skilled intervention at this time  Anticipated Discharge Disposition: other (see comments) (home w/ hospice)     Therapy Frequency: evaluation only          Plan of Care Review  Plan Of Care Reviewed With: patient, family, son (son (POA))  Progress:  (initial eval)  Outcome Summary/Follow up Plan: Severe communication deficit currently impacting all domains of language. Pt was very lethargic and intermittently alert during eval. Currently not verbalizing in response to questions or on command, however one spontaneous verbalization noted that was unintelligible. Pt noted to spontanously point for paper towel and able to use object to wipe mouth. Not following simple commands or responding to simple Y/N questions. Clinical dysphagia eval complete. Failed RN dysphagia screen. Pt very lethargic and intermittently alert. L facial droop, decr'd strength and ROM bilaterally. Trials of thins via ice and tsp and pureed consistencies given. Overt s/s of aspiration c/b throat clearing and wet vocal quality w/ thins and inconsistent swallow initiation w/ all. Pt did not attempt to swallow puree and residue was removed from oral cavity via swab. Pt's son (POA) and other family present. Pt has planned d/c to return home on hospice. SLP provided extensive education regarding s/s and risk of aspiration and  safest recs vs. comfort diet recs. Pt's family agreeable to comfort diet despite risks. Also educated regarding importance of oral care, general aspiration and reflux precautions, small bites/ sips, comfort diet recs, and only providing PO when pt alert, family verbalized understanding and agreed w/ recommendations and precautions. SLP spoke to Dr. Rico who provided verbal order for comfort diet for pt. RECS: Safest NPO, comfort diet of thins and puree, meds crushed in puree (if alert), only feed when alert, general aspiration and reflux precautions, small bites/sips (via tsp or cup),  No further ST needs at this time.           SLP Outcome Measures (last 72 hours)      SLP Outcome Measures       07/17/17 1200          SLP Outcome Measures    Outcome Measure Used? Adult NOMS  -RD      FCM Scores    FCM Chosen Swallowing  -RD      Swallowing FCM Score 1  -RD        User Key  (r) = Recorded By, (t) = Taken By, (c) = Cosigned By    Initials Name Effective Dates    MS PEPE Sanchez-SLP 09/18/16 -           Time Calculation:         Time Calculation- SLP       07/17/17 1213          Time Calculation- SLP    SLP Start Time 1045  -RD      SLP Received On 07/17/17  -RD        User Key  (r) = Recorded By, (t) = Taken By, (c) = Cosigned By    Initials Name Provider Type    TRISH Iyer MS CF-SLP Speech and Language Pathologist          Therapy Charges for Today     Code Description Service Date Service Provider Modifiers Qty    23444024045 HC ST EVAL ORAL PHARYNG SWALLOW 5 7/17/2017 MS PEPE Hendricks-SLP GN 1    58794526074 HC ST EVAL SPEECH AND PROD W LANG  3 7/17/2017 MS PEPE Hendricks-SLP GN 1             ADULT NOMS (last 72 hours)      Adult NOMS       07/17/17 1200                FCM Scores    FCM Chosen Swallowing  -RD        Swallowing FCM Score 1  -RD          User Key  (r) = Recorded By, (t) = Taken By, (c) = Cosigned By    Initials Name Effective Dates    TRISH Iyer MS  CF-SLP 16 -                Umu Iyer, MS CF-SLP  2017 and Acute Care - Speech Language Pathology   Swallow Initial Evaluation  [facility]   Clinical Swallow Evaluation     Patient Name: Kev Bean  : 1927  MRN: [id number]  Today's Date: 2017               Admit Date: 2017    Visit Dx:     ICD-10-CM ICD-9-CM   1. Pneumonia of right lower lobe due to infectious organism J18.9 483.8   2. Lethargy R53.83 780.79   3. Cerebrovascular accident (CVA), unspecified mechanism I63.9 434.91   4. Dysphagia, unspecified R13.10 787.20   5. Cognitive communication deficit R41.841 799.52     Patient Active Problem List   Diagnosis   • CAD (coronary artery disease)   • Personal history of renal cancer   • Hyperlipidemia   • Urinary retention   • CVA (cerebral vascular accident)   • Aspiration pneumonia   • Pneumonia of right lower lobe due to infectious organism     Past Medical History:   Diagnosis Date   • Cholelithiases    • Disease of thyroid gland    • Elevated troponin    • Hyperlipidemia    • Ileus    • LBBB (left bundle branch block)    • Renal insufficiency      History reviewed. No pertinent surgical history.       SWALLOW EVALUATION (last 72 hours)      Swallow Evaluation       17 1045                Clinical Impression    SLP Swallowing Diagnosis other (see comments)   suspect severe dysphagia  -RD        Rehab Potential/Prognosis, Swallowing good, to achieve stated therapy goals  -RD        Criteria for Skilled Therapeutic Interventions Met patient/family declined skilled intervention at this time;other (see comments)   agreed to eval  -RD        FCM, Swallowing 1-->Level 1  -RD        Therapy Frequency evaluation only  -RD        SLP Diet Recommendation comfort diet;thin liquids;II - pureed  -RD        Recommended Feeding/Eating Techniques small sips/bites  -RD        SLP Rec. for Method of Medication Administration meds via alternate route;meds crushed in  pudding/applesauce  -RD        Oral Motor Structure and Function    Oral Motor Anatomy and Physiology patient demonstrates anatomy that is WNL  -RD        Dentition Assessment edentulous, does not have dentures  -RD        Secretion Management WNL/WFL  -RD        Mucosal Quality dry;sticky  -RD        Gag Response absent  -RD        Volitional Swallow mild to moderate difficulties initiating volitional swallow  -RD        Oral Musculature General Assessment oral labial impairment;lingual impairment  -RD        Oral Labial Strength and Mobility left labial droop;reduced strength bilaterally  -RD        Lingual Strength and Mobility reduced strength bilaterally;reduced ROM  -RD        General Feeding/Swallowing Observations    Current Feeding Method NPO  -RD        Clinical Swallow Exam    Mode of Presentation fed by clinician;spoon  -RD        Oral Preparation Concerns thin:;pudding:;oral holding;increased prep time  -RD        Oral Transit Concerns thin:;pudding:;increased oral transit time  -RD        Oral Residue pudding:;residue throughout oral cavity;other (see comments)   removed via swab  -RD        Oral Phase Results impaired oral phase, signs of dysfunction present  -RD        Pharyngeal Phase Results sign/symptoms of pharyngeal impairment;throat clear;susupected impaired pharyngeal phase of swallowing;other (see comments)   inconsistent swallow initiation  -RD        Summary of Clinical Exam Clinical dysphagia eval complete. Failed RN dysphagia screen. Pt very lethargic and intermittently alert. L facial droop, decr'd strength and ROM bilaterally. Trials of thins via ice and tsp and pureed consistencies given. Overt s/s of aspiration c/b throat clearing and wet vocal quality w/ thins and inconsistent swallow initiation w/ all. Pt did not attempt to swallow puree and residue was removed from oral cavity via swab. Pt's son (POA) and other family present. Pt has planned d/c to return home on hospice. SLP  provided extensive education regarding s/s and risk of aspiration and safest recs vs. comfort diet recs. Pt's family agreeable to comfort diet despite risks. Also educated regarding importance of oral care, general aspiration and reflux precautions, small bites/ sips, comfort diet recs, and only providing PO when pt alert, family verbalized understanding and agreed w/ recommendations and precautions. SLP spoke to Dr. Rico who provided verbal order for comfort diet for pt. RECS: Safest NPO, comfort diet of thins and puree, meds crushed in puree (if alert), only feed when alert, general aspiration and reflux precautions, small bites/sips (via tsp or cup).  -RD        Swallow Recommendations    Eating Assistance needs constant supervision during self eating activity  -RD        Oral Care oral care with toothbrush and dentifrice BID and PRN  -RD        Modified Eating Strategies upright positioning 90 degrees;reflux precautions;NO straw use for liquid intake;other (see comments)   aspiration precautions, small bites/sips, only feed if alert  -RD        Recommended Diet NPO: unsafe for food/liquid intake;comfort diet;thin liquids;II - pureed  -RD          User Key  (r) = Recorded By, (t) = Taken By, (c) = Cosigned By    Initials Name Effective Dates    TRISH Iyer, MS CF-SLP 09/18/16 -         EDUCATION  The patient has been educated in the following areas:   Dysphagia (Swallowing Impairment) Oral Care/Hydration NPO rationale Modified Diet Instruction.    SLP Recommendation and Plan  SLP Swallowing Diagnosis: other (see comments) (suspect severe dysphagia)  SLP Diet Recommendation: comfort diet, thin liquids, II - pureed  Recommended Feeding/Eating Techniques: small sips/bites  SLP Rec. for Method of Medication Administration: meds via alternate route, meds crushed in pudding/applesauce        Criteria for Skilled Therapeutic Interventions Met: patient/family declined skilled intervention at this time, other  (see comments) (agreed to eval)  Anticipated Discharge Disposition: other (see comments) (home w/ hospice)  Rehab Potential/Prognosis, Swallowing: good, to achieve stated therapy goals  Therapy Frequency: evaluation only             Plan of Care Review  Plan Of Care Reviewed With: patient, family, son (son (POA))  Progress:  (initial eval)  Outcome Summary/Follow up Plan: Severe communication deficit currently impacting all domains of language. Pt was very lethargic and intermittently alert during eval. Currently not verbalizing in response to questions or on command, however one spontaneous verbalization noted that was unintelligible. Pt noted to spontanously point for paper towel and able to use object to wipe mouth. Not following simple commands or responding to simple Y/N questions. Clinical dysphagia eval complete. Failed RN dysphagia screen. Pt very lethargic and intermittently alert. L facial droop, decr'd strength and ROM bilaterally. Trials of thins via ice and tsp and pureed consistencies given. Overt s/s of aspiration c/b throat clearing and wet vocal quality w/ thins and inconsistent swallow initiation w/ all. Pt did not attempt to swallow puree and residue was removed from oral cavity via swab. Pt's son (POA) and other family present. Pt has planned d/c to return home on hospice. SLP provided extensive education regarding s/s and risk of aspiration and safest recs vs. comfort diet recs. Pt's family agreeable to comfort diet despite risks. Also educated regarding importance of oral care, general aspiration and reflux precautions, small bites/ sips, comfort diet recs, and only providing PO when pt alert, family verbalized understanding and agreed w/ recommendations and precautions. SLP spoke to Dr. Rico who provided verbal order for comfort diet for pt. RECS: Safest NPO, comfort diet of thins and puree, meds crushed in puree (if alert), only feed when alert, general aspiration and reflux precautions,  small bites/sips (via tsp or cup),  No further ST needs at this time.           SLP Outcome Measures (last 72 hours)      SLP Outcome Measures       07/17/17 1200          SLP Outcome Measures    Outcome Measure Used? Adult NOMS  -RD      FCM Scores    FCM Chosen Swallowing  -RD      Swallowing FCM Score 1  -RD        User Key  (r) = Recorded By, (t) = Taken By, (c) = Cosigned By    Initials Name Effective Dates    MS MARYELLEN Sanchez 09/18/16 -            Time Calculation:         Time Calculation- SLP       07/17/17 1213          Time Calculation- SLP    SLP Start Time 1045  -RD      SLP Received On 07/17/17  -RD        User Key  (r) = Recorded By, (t) = Taken By, (c) = Cosigned By    Initials Name Provider Type    TRISH Iyer MS CF-SLP Speech and Language Pathologist          Therapy Charges for Today     Code Description Service Date Service Provider Modifiers Qty    40973305436  ST EVAL ORAL PHARYNG SWALLOW 5 7/17/2017 MS MARYELLEN Hendricks GN 1    83562779218 HC ST EVAL SPEECH AND PROD W LANG  3 7/17/2017 MS FREDERICK HendricksSLP GN 1               MS FREDERICK HendricksSLP  7/17/2017

## 2017-07-17 NOTE — PROGRESS NOTES
Pharmacokinetic Consult - Vancomycin Dosing    Kev Bean is a 89 y.o. male who has been consulted for vancomycin dosing for PNA.    Relevant clinical data and objective history reviewed:  Creatinine   Date Value Ref Range Status   07/16/2017 1.00 0.60 - 1.30 mg/dL Final     BUN   Date Value Ref Range Status   07/16/2017 19 9 - 23 mg/dL Final     Estimated Creatinine Clearance: 42.5 mL/min (by C-G formula based on Cr of 1).  Lab Results   Component Value Date/Time    WBC 6.92 07/16/2017 06:51 PM    HGB 14.8 07/16/2017 06:51 PM    HCT 45.0 07/16/2017 06:51 PM    MCV 93.0 07/16/2017 06:51 PM     (L) 07/16/2017 06:51 PM     Temp Readings from Last 3 Encounters:   07/16/17 97.8 °F (36.6 °C) (Axillary)         Assessment/Plan  The patient got a dose of 1250mg in the ED. Due to age and renal function, will get a random level with am labs. Due to infection severity, will target a trough of 15-20.     Pharmacy will continue to follow the patient’s culture results and clinical progress daily.    Jovan Burch, MickyD

## 2017-07-17 NOTE — PROGRESS NOTES
Discharge Planning Assessment  [facility]     Patient Name: Kev Bean  MRN: [id number]  Today's Date: 7/17/2017    Admit Date: 7/16/2017          Discharge Needs Assessment       07/17/17 1214    Living Environment    Lives With child(nirmal), adult    Living Arrangements house    Home Accessibility stairs (2 railings present)    Stair Railings at Home none    Type of Financial/Environmental Concern none    Transportation Available ambulance    Living Environment    Provides Primary Care For no one    Primary Care Provided By child(nirmal) (specify)    Quality Of Family Relationships supportive;helpful;involved    Able to Return to Prior Living Arrangements yes    Discharge Needs Assessment    Concerns To Be Addressed basic needs concerns;discharge planning concerns    Readmission Within The Last 30 Days no previous admission in last 30 days    Outpatient/Agency/Support Group Needs home hospice    Anticipated Changes Related to Illness inability to care for self    Equipment Currently Used at Home oxygen;hospital bed;wheelchair    Equipment Needed After Discharge none    Discharge Disposition other (see comments)   home with Hospice services    Discharge Contact Information if Applicable Seth Bean ( son & POA) [phone]            Discharge Plan       07/17/17 1218    Case Management/Social Work Plan    Plan Home with Hospice    Patient/Family In Agreement With Plan yes    Additional Comments I met with family at bedside. Mr Bean lives with son and johnathan in law( Seth & Chiquita Bean [phone]). They are his primary caregivers and he is followed by Hospice. they already have equipment needed in the home ( hospital bed, WC, O2). They would like to take him back home today. I have been in contact with the Hospice RN ( Kira [phone]). She is aware of his d/c this pm and a Hospice RN will meet him at home this pm. Arrangements made for [facility] ambulance to transport home at 6pm D/C summary  faxed to Hospice at [phone].        Discharge Placement     No information found        Expected Discharge Date and Time     Expected Discharge Date Expected Discharge Time    Jul 17, 2017               Demographic Summary       07/17/17 1211    Referral Information    Admission Type inpatient    Arrived From admitted as an inpatient    Referral Source physician    Reason For Consult discharge planning    Record Reviewed history and physical;medical record    Contact Information    Permission Granted to Share Information With ;family/designee    Primary Care Physician Information    Name Timothy Adhikari)            Functional Status       07/17/17 1212    Functional Status Prior    Ambulation 3-->assistive equipment and person    Transferring 3-->assistive equipment and person    Toileting 3-->assistive equipment and person    Bathing 2-->assistive person    Dressing 2-->assistive person    Eating 0-->independent    Communication 0-->understands/communicates without difficulty    Swallowing 0-->swallows foods/liquids without difficulty    IADL    Medications assistive person    Meal Preparation assistive person    Housekeeping assistive person    Laundry assistive person    Shopping assistive person    Oral Care assistive person    Cognitive/Perceptual/Developmental    Current Mental Status/Cognitive Functioning unable to assess    Recent Changes in Mental Status/Cognitive Functioning unable to assess    Employment/Financial    Current Occupation (Previous Occupation if Retired) unable to assess   retired    Source Of Income social security    Financial Concerns none    Employment/Finance Comments [employer] Medicare replacement            Psychosocial     None            Abuse/Neglect     None            Legal     None            Substance Abuse     None            Patient Forms     None          Sonja C Kellerman, RN

## 2017-07-17 NOTE — CONSULTS
Neurology    Referring provider:   Navneet Dykes MD  [address] [facility]  [place] [address]    Reason for Consultation: Stroke    Chief complaint: Nonverbal    History of present illness:  This 89-year-old man is seen at the request of Dr. Rico for evaluation of stroke.    He was in his usual state of health until yesterday afternoon when he developed global weakness with settle down to a left-sided paralysis.  He was unable to speak clearly and became less than his usual level of alertness.    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        Review of Systems: She is nonverbal    Home meds:   Prescriptions Prior to Admission   Medication Sig Dispense Refill Last Dose   • aspirin 81 MG EC tablet Take 81 mg by mouth Daily.   7/16/2017   • carvedilol (COREG) 3.125 MG tablet Take 3.125 mg by mouth 2 (Two) Times a Day With Meals.   7/16/2017   • clopidogrel (PLAVIX) 75 MG tablet Take 75 mg by mouth Daily.   7/16/2017   • levothyroxine (SYNTHROID, LEVOTHROID) 50 MCG tablet Take 50 mcg by mouth Daily.   7/16/2017   • potassium chloride (K-DUR) 10 MEQ CR tablet Take 10 mEq by mouth 2 (Two) Times a Day.   7/16/2017   • simvastatin (ZOCOR) 20 MG tablet Take 20 mg by mouth Every Night.   7/15/2017   • tamsulosin (FLOMAX) 0.4 MG capsule 24 hr capsule Take 1 capsule by mouth Every Night.   7/15/2017       History  Past Medical History:   Diagnosis Date   • Cholelithiases    • Disease of thyroid gland    • Elevated troponin    • Hyperlipidemia    • Ileus    • LBBB (left bundle branch block)    • Renal insufficiency    , History reviewed. No pertinent surgical history.,   Family History   Problem Relation Age of Onset   • Heart disease Mother    • No Known Problems Father    • Hypertension Sister    • Hypertension Brother    • Transient  "ischemic attack Son    • Coronary artery disease Son    • Asthma Son    • Diabetes Son    • No Known Problems Son    ,   Social History   Substance Use Topics   • Smoking status: Never Smoker   • Smokeless tobacco: Current User     Types: Chew   • Alcohol use No    and Allergies:  Review of patient's allergies indicates no known allergies.,    Vital Signs   Blood pressure 100/58, pulse 66, temperature 97.6 °F (36.4 °C), temperature source Temporal Artery , resp. rate 16, height 66\" (167.6 cm), weight 135 lb (61.2 kg), SpO2 95 %.  Body mass index is 21.79 kg/(m^2).    Physical Exam:   General: Acutely ill white male, unresponsive              Neck: No bruits              Resp: Cheyne-Lundy respirations.  Decreased breath sounds right upper lobe              Cor: Regular rhythm              Extr: No edema              Skin: Warm and dry               Neuro: Patient is stuporous.  He will wave his arm in the air and appears to be delirious.    Speech is absent.    Coordination patient could not cooperate.    Reflexes are plus minus throughout.  He has an upgoing toe on the left.    Motor testing shows normal movement of his right arm and leg.  He withdraws his left leg but does not move it voluntarily.  He does plegic in his left arm.    Results Review: MRI of the brain shows a sizable right centrum semiovale acute infarct    Labs:  Lab Results (last 72 hours)     Procedure Component Value Units Date/Time    CBC & Differential [[id number]] Collected:  07/16/17 1851    Specimen:  Blood Updated:  07/16/17 1900    Narrative:       The following orders were created for panel order CBC & Differential.  Procedure                               Abnormality         Status                     ---------                               -----------         ------                     CBC Auto Differential[[id number]]        Abnormal            Final result                 Please view results for these tests on the individual orders.    " CBC Auto Differential [[id number]]  (Abnormal) Collected:  07/16/17 1851    Specimen:  Blood Updated:  07/16/17 1900     WBC 6.92 10*3/mm3      RBC 4.84 10*6/mm3      Hemoglobin 14.8 g/dL      Hematocrit 45.0 %      MCV 93.0 fL      MCH 30.6 pg      MCHC 32.9 g/dL      RDW 14.3 %      RDW-SD 48.4 fl      MPV 9.3 fL      Platelets 144 (L) 10*3/mm3      Neutrophil % 70.8 %      Lymphocyte % 16.9 (L) %      Monocyte % 7.5 %      Eosinophil % 4.2 (H) %      Basophil % 0.3 %      Immature Grans % 0.3 %      Neutrophils, Absolute 4.90 10*3/mm3      Lymphocytes, Absolute 1.17 10*3/mm3      Monocytes, Absolute 0.52 10*3/mm3      Eosinophils, Absolute 0.29 10*3/mm3      Basophils, Absolute 0.02 10*3/mm3      Immature Grans, Absolute 0.02 10*3/mm3     Lactic Acid, Plasma [[id number]]  (Normal) Collected:  07/16/17 1851    Specimen:  Blood Updated:  07/16/17 1909     Lactate 1.0 mmol/L       Falsely depressed results may occur on samples drawn from patients receiving N-Acetylcysteine (NAC) or Metamizole.       POC Troponin, Rapid [[id number]]  (Normal) Collected:  07/16/17 1852    Specimen:  Blood Updated:  07/16/17 1910     Troponin I 0.07 ng/mL       Serial Number: [id number]:  [id number]       Comprehensive Metabolic Panel [[id number]]  (Abnormal) Collected:  07/16/17 1851    Specimen:  Blood Updated:  07/16/17 1919     Glucose 121 (H) mg/dL      BUN 19 mg/dL      Creatinine 1.00 mg/dL      Sodium 135 mmol/L      Potassium 4.2 mmol/L      Chloride 102 mmol/L      CO2 26.0 mmol/L      Calcium 9.7 mg/dL      Total Protein 6.9 g/dL      Albumin 3.70 g/dL      ALT (SGPT) 8 U/L      AST (SGOT) 17 U/L      Alkaline Phosphatase 64 U/L      Total Bilirubin 0.7 mg/dL      eGFR Non African Amer 70 mL/min/1.73      Globulin 3.2 gm/dL      A/G Ratio 1.2 (L) g/dL      BUN/Creatinine Ratio 19.0     Anion Gap 7.0 mmol/L     Narrative:       National Kidney Foundation Guidelines    Stage     Description        GFR  1         Normal or High      90+  2         Mild decrease      60-89  3         Moderate decrease  30-59  4         Severe decrease    15-29  5         Kidney failure     <15    Urinalysis With / Culture If Indicated [[id number]]  (Abnormal) Collected:  07/16/17 1913    Specimen:  Urine from Urine, Catheter Updated:  07/16/17 1947     Color, UA Dark Yellow (A)     Appearance, UA Cloudy (A)     pH, UA 5.5     Specific Gravity, UA 1.020     Glucose, UA Negative     Ketones, UA Negative     Bilirubin, UA Negative     Blood, UA Small (1+) (A)     Protein, UA Negative     Leuk Esterase, UA Small (1+) (A)     Nitrite, UA Negative     Urobilinogen, UA 1.0 E.U./dL    Urinalysis, Microscopic Only [[id number]]  (Abnormal) Collected:  07/16/17 1913    Specimen:  Urine from Urine, Catheter Updated:  07/16/17 1947     RBC, UA 7-12 (A) /HPF      WBC, UA 6-12 (A) /HPF      Bacteria, UA 1+ (A) /HPF      Squamous Epithelial Cells, UA 0-2 /HPF      Hyaline Casts, UA 0-6 /LPF      Amorphous Crystals, UA Small/1+ /HPF      Methodology Manual Light Microscopy    Procalcitonin [[id number]] Collected:  07/16/17 1851    Specimen:  Blood Updated:  07/16/17 2052     Procalcitonin <0.05 ng/mL     Narrative:       As a Marker for Sepsis (Non-Neonates):   1. <0.5 ng/mL represents a low risk of severe sepsis and/or septic shock.  2. >2 ng/mL represents a high risk of severe sepsis and/or septic shock.    As a Marker for Lower Respiratory Tract Infections that require antibiotic therapy:    PCT on Admission     Antibiotic Therapy       6-12 Hrs later  > 0.5                Strongly Recommended             >0.25 - <0.5         Recommended  0.1 - 0.25           Discouraged              Remeasure/reassess PCT  <0.1                 Strongly Discouraged     Remeasure/reassess PCT                     PCT values of < 0.5 ng/mL do not exclude an infection, because localized infections (without systemic signs) may be associated with such low concentrations, or a systemic infection in  its initial stages (< 6 hours). Furthermore, increased PCT can occur without infection. PCT concentrations between 0.5 and 2.0 ng/mL should be interpreted taking into account the patient's history. It is recommended to retest PCT within 6-24 hours if any concentrations < 2 ng/mL are obtained.    POC Glucose Fingerstick [[id number]]  (Normal) Collected:  07/16/17 2240    Specimen:  Blood Updated:  07/16/17 2248     Glucose 113 mg/dL     Narrative:       Verify with Lab Meter: [id number] : [id number] Abdiaziz Camp    Urinalysis With / Microscopic If Indicated [[id number]]  (Abnormal) Collected:  07/16/17 2325    Specimen:  Urine from Urine, Clean Catch Updated:  07/16/17 2330     Color, UA Yellow     Appearance, UA Cloudy (A)     pH, UA 5.5     Specific Gravity, UA 1.028     Glucose, UA Negative     Ketones, UA Trace (A)     Bilirubin, UA Negative     Blood, UA Moderate (2+) (A)     Protein, UA Trace (A)     Leuk Esterase, UA Small (1+) (A)     Nitrite, UA Negative     Urobilinogen, UA 1.0 E.U./dL    Urinalysis, Microscopic Only [[id number]]  (Abnormal) Collected:  07/16/17 2325    Specimen:  Urine from Urine, Clean Catch Updated:  07/16/17 2332     RBC, UA 7-12 (A) /HPF      WBC, UA 6-12 (A) /HPF      Bacteria, UA None Seen /HPF      Squamous Epithelial Cells, UA None Seen /HPF      Hyaline Casts, UA None Seen /LPF      Methodology Automated Microscopy    Lipid Panel [[id number]]  (Abnormal) Collected:  07/17/17 0510    Specimen:  Blood Updated:  07/17/17 0632     Total Cholesterol 105 mg/dL      Triglycerides 79 mg/dL      HDL Cholesterol 22 (L) mg/dL      LDL Cholesterol  70 mg/dL     Narrative:       Cholesterol Reference Ranges:   Desirable       < 200 mg/dL   Borderline    200-239 mg/dL   High Risk       > 239 mg/dL    Triglyceride Reference Ranges:   Normal          < 150 mg/dL   Borderline    150-199 mg/dL   High          200-499 mg/dL   Very High       > 499 mg/dL    HDL Reference Ranges:   Low               < 40 mg/dL   High             > 59 mg/dL    LDL Reference Ranges:   Optimal         < 100 mg/dL   Near Optimal  100-129 mg/dL   Borderline    130-159 mg/dL   High          160-189 mg/dL   Very High       > 189 mg/dL    Urine Culture [[id number]]  (Normal) Collected:  07/16/17 1913    Specimen:  Urine from Urine, Catheter Updated:  07/17/17 0710     Urine Culture Culture in progress    Hemoglobin A1c [[id number]]  (Abnormal) Collected:  07/17/17 0510    Specimen:  Blood Updated:  07/17/17 0733     Hemoglobin A1C 6.20 (H) %     Narrative:       The American Diabetes Association recommends maintenance of Hemoglobin A1C at 7.0% or lower. Goals for Hemoglobin A1C reduction may need to be modified if hypoglycemia is a problem.    Blood Culture [[id number]]  (Normal) Collected:  07/16/17 2019    Specimen:  Blood from Arm, Right Updated:  07/17/17 0901     Blood Culture No growth at less than 24 hours    Blood Culture [[id number]]  (Normal) Collected:  07/16/17 2019    Specimen:  Blood from Hand, Left Updated:  07/17/17 0901     Blood Culture No growth at less than 24 hours          Rads:  Imaging Results (last 72 hours)     Procedure Component Value Units Date/Time    CT Head Without Contrast [[id number]]  (Abnormal) Collected:  07/16/17 1817     Updated:  07/16/17 1836    Narrative:       EXAM:    CT Head Without Intravenous Contrast    CLINICAL HISTORY:    89 years old, male; Signs and symptoms; Weakness, extremity; Left; Additional   info: Code 19    TECHNIQUE:    Axial computed tomography images of the head/brain without intravenous   contrast.  This CT exam was performed using one or more of the following dose   reduction techniques:  automated exposure control, adjustment of the mA and/or   kV according to patient size, and/or use of iterative reconstruction technique.    COMPARISON:    No relevant prior studies available.    FINDINGS:    Brain:  Generalized cerebral involutional change, age-appropriate.    Subcortical  and periventricular white matter hypodensities.  No hemorrhage.    Ventricles:  Ventricular system demonstrates moderate diffuse compensatory   enlargement.    Bones/joints:  Unremarkable.  No acute fracture.    Soft tissues:  Unremarkable.    Sinuses:  Trace air-fluid level in the sphenoid sinus, consider mild acute   sphenoid sinusitis otherwise no acute findings.  Mild mucous membrane   thickening in the maxillary sinuses and ethmoid air cells.    Mastoid air cells:  Unremarkable as visualized.  No mastoid effusion.      Impression:       1.  Trace air-fluid level in the sphenoid sinus, consider mild acute sphenoid   sinusitis otherwise no acute findings.    2.  Generalized cerebral involutional change, age-appropriate.    3.  Chronic microvascular ischemic disease.    THIS DOCUMENT HAS BEEN ELECTRONICALLY SIGNED BY CHRISTI CARTER MD    XR Chest 1 View [[id number]]  (Abnormal) Collected:  07/16/17 1834     Updated:  07/16/17 1941    Narrative:       EXAM:    XR Chest, 1 View    CLINICAL HISTORY:    89 years old, male; Signs and symptoms; Shortness of breath; Additional info:   Weakness    TECHNIQUE:    Frontal view of the chest.    COMPARISON:    No relevant prior studies available.    FINDINGS:    Lungs:  Asymmetric right lung airspace opacities, consider pneumonia.    Pleural space:  Blunting of bilateral costophrenic angles, worse on the   right.  No pneumothorax.    Heart:  Moderate cardiomediastinal enlargement, correlate for cardiomegaly   versus pericardial effusion.    Mediastinum:  Mediastinal surgical clips.    Bones/joints: Sternal sutures intact.      Impression:       1.  Asymmetric right lung airspace opacities, consider pneumonia.    2.  Small left and moderate right pleural effusions.  3.  Moderate cardiomediastinal enlargement, correlate for cardiomegaly versus   pericardial effusion.      THIS DOCUMENT HAS BEEN ELECTRONICALLY SIGNED BY CHRISTI CARTER MD    MRI Brain Without Contrast [[id number]]   (Abnormal) Collected:  07/17/17 0432     Updated:  07/17/17 0754    Addenda:        THIS REPORT CONTAINS FINDINGS THAT MAY BE CRITICAL TO PATIENT CARE. The   findings were verbally communicated via telephone conference with Dr. Pineda   at   7:54 AM EDT on 7/17/2017. The findings were acknowledged and understood.    THIS DOCUMENT HAS BEEN ELECTRONICALLY SIGNED BY GINGER MCDONNELL MD  Signed:  07/17/17 0753 by Ginger Mcdonnell MD    Narrative:       EXAM:    MR Head Without Intravenous Contrast    CLINICAL HISTORY:    89 years old, male; Cerebral infarction, unspecified; Pneumonia, unspecified   organism; Other fatigue; Signs and symptoms; Malaise or fatigue and weakness,   extremity and weakness, facial; Left; Patient HX: Possible CVA, left sided   weakness and left facial droop, lethargy, recent admit for ileus, HX renal   cancer **best images obtainable at this time, multiple repeats-severe motion   noted/limited exam**; Additional info: Stroke like symptoms, loss of   consciousness, droop    TECHNIQUE:    Magnetic resonance images of the head/brain without intravenous contrast in   multiple planes.    COMPARISON:    CT HEAD WO CONTRAST 7/16/2017 6:17:45 PM    FINDINGS:    Restricted diffusion in the RIGHT high frontal periventricular white   matter/centrum semiovale measuring approximately 4.7 x 2.1 cm with mild   surrounding edema without mass effect and no evidence of hemorrhage.        Mild confluent and focal areas of abnormal T2 prolongation within the   periventricular and subcortical white matter of the supratentorial brain   without any other region of restricted diffusion.      Midline brain structures including the corpus callosum, pituitary gland,   pineal region, and craniovertebral junction are unremarkable. Ventricles and   sulci are mildly dilated without evidence of hydrocephalus. Intracranial   vessels demonstrate a normal flow-void.       Impression:         ACUTE/SUBACUTE ISCHEMIC INFARCT in the RIGHT  centrum semiovale without   evidence of obvious intracranial hemorrhage or hemorrhagic transformation.       NOTE: Suboptimal study due to extensive motion artifact.      THIS DOCUMENT HAS BEEN ELECTRONICALLY SIGNED BY VERA FREEMAN MD            Assessment: Acute right hemisphere stroke    Pneumonia       Plan:    Patient started on IV fluids normal saline at 75 cc an hour.    He should be on facemask oxygen since she's been breathing through his mouth.        Comment:   Guarded prognosis discussed with the patient's son.  DNR as appropriate    I discussed the patients findings and my recommendations with family      Dimas Hernandez MD  07/17/17  1:29 PM

## 2017-07-19 LAB — BACTERIA SPEC AEROBE CULT: ABNORMAL

## 2017-07-21 LAB — BACTERIA SPEC AEROBE CULT: NORMAL

## 2017-07-21 NOTE — ED NOTES
I spoke with Kira with [facility] about the pos bc. Informed that the pt  yesterday.     Isidro Schwarz, APRN  [phone]

## 2017-07-23 LAB
BACTERIA SPEC AEROBE CULT: ABNORMAL
GRAM STN SPEC: ABNORMAL
ISOLATED FROM: ABNORMAL